# Patient Record
Sex: FEMALE | Race: WHITE | Employment: UNEMPLOYED | URBAN - NONMETROPOLITAN AREA
[De-identification: names, ages, dates, MRNs, and addresses within clinical notes are randomized per-mention and may not be internally consistent; named-entity substitution may affect disease eponyms.]

---

## 2023-02-21 ENCOUNTER — ANESTHESIA EVENT (OUTPATIENT)
Age: 68
End: 2023-02-21
Payer: MEDICARE

## 2023-02-21 RX ORDER — SODIUM CHLORIDE, SODIUM LACTATE, POTASSIUM CHLORIDE, CALCIUM CHLORIDE 600; 310; 30; 20 MG/100ML; MG/100ML; MG/100ML; MG/100ML
INJECTION, SOLUTION INTRAVENOUS CONTINUOUS
Status: CANCELLED | OUTPATIENT
Start: 2023-02-21

## 2023-02-21 RX ORDER — SODIUM CHLORIDE 0.9 % (FLUSH) 0.9 %
5-40 SYRINGE (ML) INJECTION EVERY 12 HOURS SCHEDULED
Status: CANCELLED | OUTPATIENT
Start: 2023-02-21

## 2023-02-22 ENCOUNTER — HOSPITAL ENCOUNTER (OUTPATIENT)
Age: 68
Setting detail: OUTPATIENT SURGERY
Discharge: HOME | End: 2023-02-22
Attending: OPHTHALMOLOGY | Admitting: OPHTHALMOLOGY
Payer: MEDICARE

## 2023-02-22 ENCOUNTER — ANESTHESIA (OUTPATIENT)
Age: 68
End: 2023-02-22
Payer: MEDICARE

## 2023-02-22 VITALS
DIASTOLIC BLOOD PRESSURE: 70 MMHG | SYSTOLIC BLOOD PRESSURE: 119 MMHG | TEMPERATURE: 98.1 F | RESPIRATION RATE: 17 BRPM | WEIGHT: 201 LBS | HEART RATE: 72 BPM | BODY MASS INDEX: 30.46 KG/M2 | HEIGHT: 68 IN | OXYGEN SATURATION: 97 %

## 2023-02-22 PROCEDURE — 3700000001 HC ADD 15 MINUTES (ANESTHESIA): Performed by: OPHTHALMOLOGY

## 2023-02-22 PROCEDURE — 2709999900 HC NON-CHARGEABLE SUPPLY: Performed by: OPHTHALMOLOGY

## 2023-02-22 PROCEDURE — 7100000010 HC PHASE II RECOVERY - FIRST 15 MIN: Performed by: OPHTHALMOLOGY

## 2023-02-22 PROCEDURE — 3600000002 HC SURGERY LEVEL 2 BASE: Performed by: OPHTHALMOLOGY

## 2023-02-22 PROCEDURE — 6360000002 HC RX W HCPCS: Performed by: OPHTHALMOLOGY

## 2023-02-22 PROCEDURE — 3700000000 HC ANESTHESIA ATTENDED CARE: Performed by: OPHTHALMOLOGY

## 2023-02-22 PROCEDURE — 6360000002 HC RX W HCPCS: Performed by: NURSE ANESTHETIST, CERTIFIED REGISTERED

## 2023-02-22 PROCEDURE — 3600000012 HC SURGERY LEVEL 2 ADDTL 15MIN: Performed by: OPHTHALMOLOGY

## 2023-02-22 PROCEDURE — 2500000003 HC RX 250 WO HCPCS: Performed by: OPHTHALMOLOGY

## 2023-02-22 PROCEDURE — V2632 POST CHMBR INTRAOCULAR LENS: HCPCS | Performed by: OPHTHALMOLOGY

## 2023-02-22 PROCEDURE — 6370000000 HC RX 637 (ALT 250 FOR IP): Performed by: OPHTHALMOLOGY

## 2023-02-22 PROCEDURE — 2500000003 HC RX 250 WO HCPCS

## 2023-02-22 DEVICE — IMPLANTABLE DEVICE: Type: IMPLANTABLE DEVICE | Site: EYE | Status: FUNCTIONAL

## 2023-02-22 RX ORDER — SODIUM CHLORIDE 0.9 % (FLUSH) 0.9 %
5-40 SYRINGE (ML) INJECTION PRN
Status: DISCONTINUED | OUTPATIENT
Start: 2023-02-22 | End: 2023-02-22 | Stop reason: HOSPADM

## 2023-02-22 RX ORDER — MIDAZOLAM HYDROCHLORIDE 1 MG/ML
INJECTION INTRAMUSCULAR; INTRAVENOUS PRN
Status: DISCONTINUED | OUTPATIENT
Start: 2023-02-22 | End: 2023-02-22 | Stop reason: SDUPTHER

## 2023-02-22 RX ORDER — SODIUM CHLORIDE 0.9 % (FLUSH) 0.9 %
5-40 SYRINGE (ML) INJECTION EVERY 12 HOURS SCHEDULED
Status: CANCELLED | OUTPATIENT
Start: 2023-02-22

## 2023-02-22 RX ORDER — EPINEPHRINE 1 MG/ML(1)
AMPUL (ML) INJECTION PRN
Status: DISCONTINUED | OUTPATIENT
Start: 2023-02-22 | End: 2023-02-22 | Stop reason: ALTCHOICE

## 2023-02-22 RX ORDER — SODIUM CHLORIDE 0.9 % (FLUSH) 0.9 %
5-40 SYRINGE (ML) INJECTION EVERY 12 HOURS SCHEDULED
Status: DISCONTINUED | OUTPATIENT
Start: 2023-02-22 | End: 2023-02-22 | Stop reason: HOSPADM

## 2023-02-22 RX ORDER — SODIUM CHLORIDE 9 MG/ML
INJECTION, SOLUTION INTRAVENOUS PRN
Status: DISCONTINUED | OUTPATIENT
Start: 2023-02-22 | End: 2023-02-22 | Stop reason: HOSPADM

## 2023-02-22 RX ORDER — MOXIFLOXACIN 5 MG/ML
SOLUTION/ DROPS OPHTHALMIC PRN
Status: DISCONTINUED | OUTPATIENT
Start: 2023-02-22 | End: 2023-02-22 | Stop reason: ALTCHOICE

## 2023-02-22 RX ORDER — FENTANYL CITRATE 50 UG/ML
50 INJECTION, SOLUTION INTRAMUSCULAR; INTRAVENOUS EVERY 5 MIN PRN
Status: CANCELLED | OUTPATIENT
Start: 2023-02-22

## 2023-02-22 RX ORDER — TETRACAINE HYDROCHLORIDE 5 MG/ML
SOLUTION OPHTHALMIC PRN
Status: DISCONTINUED | OUTPATIENT
Start: 2023-02-22 | End: 2023-02-22 | Stop reason: ALTCHOICE

## 2023-02-22 RX ORDER — SODIUM CHLORIDE, POTASSIUM CHLORIDE, CALCIUM CHLORIDE, MAGNESIUM CHLORIDE, SODIUM ACETATE, AND SODIUM CITRATE 6.4; .75; .48; .3; 3.9; 1.7 MG/ML; MG/ML; MG/ML; MG/ML; MG/ML; MG/ML
SOLUTION IRRIGATION PRN
Status: DISCONTINUED | OUTPATIENT
Start: 2023-02-22 | End: 2023-02-22 | Stop reason: ALTCHOICE

## 2023-02-22 RX ORDER — SODIUM CHLORIDE 0.9 % (FLUSH) 0.9 %
5-40 SYRINGE (ML) INJECTION PRN
Status: CANCELLED | OUTPATIENT
Start: 2023-02-22

## 2023-02-22 RX ORDER — ONDANSETRON 2 MG/ML
4 INJECTION INTRAMUSCULAR; INTRAVENOUS
Status: CANCELLED | OUTPATIENT
Start: 2023-02-22 | End: 2023-02-23

## 2023-02-22 RX ORDER — ALBUTEROL SULFATE 2.5 MG/3ML
2.5 SOLUTION RESPIRATORY (INHALATION)
Status: DISCONTINUED | OUTPATIENT
Start: 2023-02-22 | End: 2023-02-22 | Stop reason: HOSPADM

## 2023-02-22 RX ORDER — SODIUM CHLORIDE, SODIUM LACTATE, POTASSIUM CHLORIDE, CALCIUM CHLORIDE 600; 310; 30; 20 MG/100ML; MG/100ML; MG/100ML; MG/100ML
INJECTION, SOLUTION INTRAVENOUS CONTINUOUS
Status: CANCELLED | OUTPATIENT
Start: 2023-02-22

## 2023-02-22 RX ADMIN — MIDAZOLAM HYDROCHLORIDE 2 MG: 2 INJECTION, SOLUTION INTRAMUSCULAR; INTRAVENOUS at 07:52

## 2023-02-22 ASSESSMENT — PAIN - FUNCTIONAL ASSESSMENT: PAIN_FUNCTIONAL_ASSESSMENT: 0-10

## 2023-02-22 NOTE — OP NOTE
OPERATIVE REPORT    PATIENT INFORMATION:    Patient: Sagrario Edwards: [id number] SSN: [id number]  YOB: 1955 Age: 78 y.o. Sex: female      LOCATION OF SURGERY: [address]:  02/22/23      PRE-OPERATIVE DIAGNOSIS: Cataract Right eye. POST OPERATIVE DIAGNOSIS: Cataract Right eye. PROCEDURE PERFORMED: Phacoemulsification with intraocular lens Right eye. SURGEON: Jonathan Alvarez M.D. ANESTHESIA: Monitored anesthesia. COMPLICATIONS: None. BLOOD LOSS: None      INDICATIONS FOR PROCEDURE:  This is a 78y.o. year old female with progressively decreasing vision in the right eye. Risks, benefits and alternatives of surgery were explained at length with the patient and informed consent was obtained. PROCEDURE:  The patient was met in the pre-operative holding area where confirmation was made that the right eye was to be operated on and surgical eye was marked. The patient was taken to the operating room where anesthesia staff was present to give temporary sedation. Following the instillation of topical tetracaine drops to the operative eye the patient was then prepped and draped in the usual sterile fashion for ophthalmic surgery. The lid speculum was placed and the lids were retracted. A paracentesis was made through the clear cornea, shugarcaine was injected in the eye for improved dilation. The anterior chamber was deepened with viscoelastic material. A 2.4 mm keratome was used to make a self-sealing clear corneal incision. Capsulorrhexis was initiated with a cystotome and completed with Utrata forceps without difficulty. Hydrodissection was completed and good fluid wave was visualized. Phacoemulsification was initiated and completed in a divide and conquer fashion without difficulty.  Irrigation and aspiration was used to remove the residual cortical material from the capsular bag and then the capsular bag and the anterior chamber were deepened with viscoelastic material. The bag was inspected and found to be free of any tears or defects. The intraocular lens implant listed below was placed into the capsular bag with good centration. Irrigation and aspiration was used to remove the residual viscoelastic material from the capsular bag and the anterior chamber and the incision sites were hydrated the BSS and cannula. The incisions were rehydrated and inspected with a weck-michael sponge and were found to be water tight. The eye was inspected and the anterior chamber was deep, the pupil round, and the lens was in good position. The lid speculum was then removed under visualization. Several drops of antibiotic eye drops were instilled into patient's operated eye. The operated eye was then covered with an eyeshield. The patient tolerated the procedure well and was transferred to recovery in good condition. Implant Name Type Inv.  Item Serial No.  Lot No. LRB No. Used Action   LENS INTOCU AD +10.5 DIOPT L13MM DIA6MM AC D5.4MM A - [id number]  LENS INTOCU AD +10.5 DIOPT L13MM DIA6MM AC D5.4MM A [id number] [address] [place] [address] [employer]-  Right 1 Implanted       Omkar Nguyen MD  02/22/23 12:36 PM

## 2023-02-22 NOTE — DISCHARGE INSTRUCTIONS
POST-OPERATIVE INSTRUCTIONS FOR CATARACT SURGERY     1. No heavy lifting (no more than 5 pounds). No Bending at waist and No strenuous activity for one (1) week. 2. DO NOT RUB YOUR EYE!!! Wear eye protection at all times when going outdoors (glasses, sunglasses,        ect) and wear shield while sleeping/napping for the first week after surgery. 3. DO NOT GET WATER IN YOUR EYES FOR THE NEXT 3 DAYS. You may gently clean your face and you        may shower, but don't put any pressure on the eye. Ladies, no eye makeup for one (1) week after surgery. Do not swim or get into a hot tub for three (3) weeks. 4. You may experience eye irritation, aching, itching and blurred vision for several days. Use Tylenol for         Discomfort but DO NOT RUB YOUR EYE . 5. Call your doctor with any increased pain, redness, nausea, vomiting, or worsening vision. Also call your doctor        with new flashes of light, many new floaters or a curtain/veil coming into your vision.                                                8 Salma Torresidi YOUR HANDS BEFORE PUTTING IN YOUR DROPS                                       ALLOW 5 MINUTES BETWEEN DIFFERENT DROPS                       IF YOU HAVE ANY QUESTION OR CONCERNS DURING OFFICE HOUR                      CALL [phone] OR [phone] AFTER HOURS OR ON WEEKENDS

## 2023-02-22 NOTE — H&P
SEE original H&P Scanned into chart. Date of Surgery Update: Donell Noe was seen and examined. History and physical has been reviewed. The patient has been examined. There have been no significant clinical changes since the completion of the originally dated History and Physical.    Pt complains of persistent poor vision in planned operative eye affecting ability to perform basic ADLs. Past Medical History:   Diagnosis Date    Sleep apnea     Sleep apnea        Family Medical History: Non-contributory    HOME MED LIST:  [unfilled]    Allergies   Allergen Reactions    Pcn [Penicillins] Hives       Review of Systems  Constitutional:  Negative for chills and fever. HENT:  Negative for congestion and sore throat. Eyes:  Positive for blurred vision. Respiratory:  Negative for cough and shortness of breath. Cardiovascular:  Negative for chest pain and palpitations. Gastrointestinal:  Negative for nausea and vomiting. Genitourinary:  Negative for dysuria. Musculoskeletal:  Negative for myalgias. Skin:  Negative for rash. Neurological:  Negative for dizziness and headaches. Vitals:    02/22/23 0654   BP: (!) 146/82   Pulse: 82   Resp: 16   Temp: 98.1 °F (36.7 °C)   SpO2: 97%       Alert & Oriented x 3  CV: Regular Rate, Regular rhythm, no murmurs/rubs/gallops  Pulm: Clear to auscultation bilaterally, no wheezes/rales/rhonchi  Neuro: CNII-XII grossly intact      Proceed with Surgery as planned.      Signed By:Selina Wadsworth MD   02/22/23, 7:19 AM

## 2023-02-22 NOTE — ANESTHESIA POSTPROCEDURE EVALUATION
Department of Anesthesiology  Postprocedure Note    Patient:  Paula Springer  MRN: [id number]  YOB: 1955  Date of evaluation: 2/22/2023      Procedure Summary     Date: 02/22/23 Room / Location: [facility] MAIN 01 / [facility] MAIN OR    Anesthesia Start: 0750 Anesthesia Stop: 0815    Procedure: PHACO IOL OD (Right: Eye) Diagnosis:       Nuclear sclerotic cataract of left eye      (Nuclear sclerotic cataract of left eye [H25.12])    Surgeons: Angelika Hart MD Responsible Provider: ALVAREZ Jarrell CRNA    Anesthesia Type: MAC ASA Status: 2          Anesthesia Type: MAC    Nerissa Phase I: Nerissa Score: 10    Nerissa Phase II: Nerissa Score: 10      Anesthesia Post Evaluation    Patient location during evaluation: PACU  Patient participation: complete - patient participated  Level of consciousness: awake  Pain score: 0  Airway patency: patent  Nausea & Vomiting: no nausea  Complications: no  Cardiovascular status: blood pressure returned to baseline  Respiratory status: acceptable  Hydration status: euvolemic

## 2023-02-22 NOTE — ANESTHESIA PRE PROCEDURE
Department of Anesthesiology  Preprocedure Note       Name:  Myra Yin   Age:  78 y.o.  :  1955                                          MRN:  [id number]         Date:  2023      Surgeon: Castillo Keating):  Shaggy Chávez MD    Procedure: Procedure(s):  PHACO IOL OD    Medications prior to admission:   Prior to Admission medications    Not on File       Current medications:    No current facility-administered medications for this encounter. Allergies: Allergies   Allergen Reactions    Pcn [Penicillins] Hives       Problem List:  There is no problem list on file for this patient. Past Medical History:        Diagnosis Date    Sleep apnea     Sleep apnea        Past Surgical History:        Procedure Laterality Date     SECTION       SECTION         Social History:    Social History     Tobacco Use    Smoking status: Former     Packs/day: 1.00     Types: Cigarettes    Smokeless tobacco: Never   Substance Use Topics    Alcohol use: Not Currently                                Counseling given: Not Answered      Vital Signs (Current):   Vitals:    23 1229 23 0654   BP:  (!) 146/82   Pulse:  82   Resp:  16   Temp:  36.7 °C (98.1 °F)   TempSrc:  Temporal   SpO2:  97%   Weight: 201 lb (91.2 kg)    Height: 5' 8\" (1.727 m)                                               BP Readings from Last 3 Encounters:   23 (!) 146/82       NPO Status: Time of last liquid consumption: 2200                        Time of last solid consumption: 2300                        Date of last liquid consumption: 23                        Date of last solid food consumption: 23    BMI:   Wt Readings from Last 3 Encounters:   23 201 lb (91.2 kg)   23 201 lb (91.2 kg)     Body mass index is 30.56 kg/m².     CBC: No results found for: WBC, RBC, HGB, HCT, MCV, RDW, PLT    CMP: No results found for: NA, K, CL, CO2, BUN, CREATININE, GFRAA, AGRATIO, LABGLOM, GLUCOSE, GLU, PROT, CALCIUM, BILITOT, ALKPHOS, AST, ALT    POC Tests: No results for input(s): POCGLU, POCNA, POCK, POCCL, POCBUN, POCHEMO, POCHCT in the last 72 hours. Coags: No results found for: PROTIME, INR, APTT    HCG (If Applicable): No results found for: PREGTESTUR, PREGSERUM, HCG, HCGQUANT     ABGs: No results found for: PHART, PO2ART, GNG3LZW, AVH0BIA, BEART, [id number]     Type & Screen (If Applicable):  No results found for: LABABO, LABRH    Drug/Infectious Status (If Applicable):  No results found for: HIV, HEPCAB    COVID-19 Screening (If Applicable): No results found for: COVID19        Anesthesia Evaluation  Patient summary reviewed and Nursing notes reviewed  Airway: Mallampati: II  TM distance: >3 FB     Mouth opening: > = 3 FB   Dental:          Pulmonary:Negative Pulmonary ROS   (+) sleep apnea:                             Cardiovascular:Negative CV ROS  Exercise tolerance: good (>4 METS),           Rhythm: regular  Rate: normal                    Neuro/Psych:   Negative Neuro/Psych ROS              GI/Hepatic/Renal: Neg GI/Hepatic/Renal ROS            Endo/Other: Negative Endo/Other ROS                    Abdominal:   (+) obese,           Vascular: negative vascular ROS. Other Findings:           Anesthesia Plan      MAC     ASA 2             Anesthetic plan and risks discussed with patient.       Plan discussed with surgical team.                    ALVAREZ Estrada - CRNA   2/22/2023

## 2024-03-25 ENCOUNTER — TRANSCRIBE ORDERS (OUTPATIENT)
Facility: HOSPITAL | Age: 69
End: 2024-03-25

## 2024-03-25 DIAGNOSIS — N95.9 MENOPAUSAL PROBLEM: Primary | ICD-10-CM

## 2024-04-04 ENCOUNTER — HOSPITAL ENCOUNTER (OUTPATIENT)
Age: 69
Discharge: HOME | End: 2024-04-04
Payer: MEDICARE

## 2024-04-04 DIAGNOSIS — N95.9 MENOPAUSAL PROBLEM: ICD-10-CM

## 2024-04-04 PROCEDURE — 77080 DXA BONE DENSITY AXIAL: CPT

## (undated) DEVICE — NDL CNTR 40CT FM MAG: Brand: MEDLINE INDUSTRIES, INC.

## (undated) DEVICE — SOLUTION IRRIG 500ML STRL H2O NONPYROGENIC

## (undated) DEVICE — GOWN,PREVENTION PLUS,XL,ST,24/CS: Brand: MEDLINE

## (undated) DEVICE — GLOVE ORANGE PI 8   MSG9080

## (undated) DEVICE — GLOVE SURG SZ 65 THK91MIL LTX FREE SYN POLYISOPRENE

## (undated) DEVICE — SKIN MARKER,REGULAR TIP WITH RULER: Brand: DEVON